# Patient Record
Sex: FEMALE | Race: BLACK OR AFRICAN AMERICAN
[De-identification: names, ages, dates, MRNs, and addresses within clinical notes are randomized per-mention and may not be internally consistent; named-entity substitution may affect disease eponyms.]

---

## 2020-09-10 ENCOUNTER — HOSPITAL ENCOUNTER (EMERGENCY)
Dept: HOSPITAL 72 - EMR | Age: 25
Discharge: HOME | End: 2020-09-10
Payer: MEDICAID

## 2020-09-10 VITALS — SYSTOLIC BLOOD PRESSURE: 120 MMHG | DIASTOLIC BLOOD PRESSURE: 70 MMHG

## 2020-09-10 VITALS — HEIGHT: 64 IN | WEIGHT: 209 LBS | BODY MASS INDEX: 35.68 KG/M2

## 2020-09-10 VITALS — SYSTOLIC BLOOD PRESSURE: 118 MMHG | DIASTOLIC BLOOD PRESSURE: 75 MMHG

## 2020-09-10 DIAGNOSIS — E10.9: ICD-10-CM

## 2020-09-10 DIAGNOSIS — L02.412: Primary | ICD-10-CM

## 2020-09-10 PROCEDURE — 90715 TDAP VACCINE 7 YRS/> IM: CPT

## 2020-09-10 PROCEDURE — 99282 EMERGENCY DEPT VISIT SF MDM: CPT

## 2020-09-10 PROCEDURE — 90471 IMMUNIZATION ADMIN: CPT

## 2020-09-10 NOTE — EMERGENCY ROOM REPORT
History of Present Illness


General


Chief Complaint:  Skin Rash/Abscess


Source:  Patient





Present Illness


HPI


The patient presents with a painful lump in her left axilla.  The pain is rated 

4/10 and aching nonradiating and constant.  She has not taken any medication 

for the pain.  She denies fevers or chills.





The patient was seen in July with a similar condition in the right axilla.  

This was successfully treated with Bactrim.





Patient is diabetic.





The patient denies any exposure to COVID-19 positive contacts.





No sore throat, chest pain, palpitations, nausea, vomiting, diarrhea, dysuria, 

abdominal pain, shortness of breath, joint pain, headache.





Last menstruation September 1.  Normal for patient.





Patient uncertain of last tetanus vaccination.


Allergies:  


Coded Allergies:  


     No Known Allergies (Unverified , 2/21/15)





COVID-19 Screening


Contact w/high risk pt:  No


Experienced COVID-19 symptoms?:  No


COVID-19 Testing performed PTA:  No





Patient History


Last Menstrual Period:  9/1


Pregnant Now:  No


Reviewed Nursing Documentation:  PMH: Agreed; PSxH: Agreed





Nursing Documentation-PMH


Past Medical History:  No History, Except For


Hx Diabetes:  Yes - type 1





Review of Systems


All Other Systems:  negative except mentioned in HPI





Physical Exam





Vital Signs








  Date Time  Temp Pulse Resp B/P (MAP) Pulse Ox O2 Delivery O2 Flow Rate FiO2


 


9/10/20 09:56 98.4 87 15 118/75 (89) 99 Room Air  








Sp02 EP Interpretation:  reviewed, normal


General Appearance:  well appearing, no apparent distress, GCS 15


Head:  normocephalic


Eyes:  bilateral eye normal inspection, bilateral eye PERRL


ENT:  other


Neck:  full range of motion - Wearing a mask, supple


Respiratory:  normal inspection


Cardiovascular #1:  regular rate, rhythm


Cardiovascular #2:  2+ radial (L)


Gastrointestinal:  normal inspection, overweight


Musculoskeletal:  gait/station normal, normal range of motion


Neurologic:  alert, distal neuro normal, grossly normal


Psychiatric:  mood/affect normal


Skin:  normal color, warm/dry, other - Painful nodule left axilla without 

fluctuance or erythema





Medical Decision Making


Diagnostic Impression:  


 Primary Impression:  


 Abscess of axilla, left


ER Course


Patient presents with a painful nodule left axilla.  Differential includes 

cellulitis, abscess, lymphadenopathy amongst others.  The lesion is not 

fluctuant and not ready for I and D.  Antibiotics and analgesia indicated.  

Patient declines analgesia at this time.  Tetanus is indicated.





Discussed local care with patient.  Also discussed the need for outpatient 

follow-up.





Patient stable for outpatient observation and treatment.





Last Vital Signs








  Date Time  Temp Pulse Resp B/P (MAP) Pulse Ox O2 Delivery O2 Flow Rate FiO2


 


9/10/20 10:30 98.2 85 16 120/70 98 Room Air  








Status:  improved


Disposition:  HOME, SELF-CARE


Condition:  Improved


Scripts


Bacitracin (Bacitracin) 28.4 Gm Oint...g.


1 APPLIC TOPIC BID, #30 GM


   Prov: Jose Shay MD         9/10/20 


Trimethoprim/Sulfamethoxazole 160/800* (BACTRIM DS TABLET*) 1 Each Tablet


1 TAB ORAL Q12H, #14 TAB 0 Refills


   Prov: Jose Shay MD         9/10/20











Jose Shay MD Sep 10, 2020 10:21